# Patient Record
Sex: FEMALE | Race: WHITE | NOT HISPANIC OR LATINO | URBAN - METROPOLITAN AREA
[De-identification: names, ages, dates, MRNs, and addresses within clinical notes are randomized per-mention and may not be internally consistent; named-entity substitution may affect disease eponyms.]

---
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
## 2023-03-15 NOTE — BRIEF OPERATIVE NOTE - NSICDXBRIEFPREOP_GEN_ALL_CORE_FT
PRE-OP DIAGNOSIS:  Scar contracture 13-May-2022 15:44:57  Asiya Fitzgerald  Macromastia 13-May-2022 15:44:39  Asiya Fitzgerald  
Mother

## 2023-08-17 NOTE — ED PEDIATRIC NURSE NOTE - CAS TRG GEN SKIN CONDITION
Warm
VITALS:  Vital Signs Last 24 Hrs  T(C): 37.1 (17 Aug 2023 00:23), Max: 37.5 (16 Aug 2023 21:26)  T(F): 98.7 (17 Aug 2023 00:23), Max: 99.5 (16 Aug 2023 21:26)  HR: 71 (17 Aug 2023 00:23) (39 - 71)  BP: 138/76 (17 Aug 2023 00:23) (132/74 - 156/72)  BP(mean): --  RR: 16 (17 Aug 2023 00:23) (16 - 18)  SpO2: 97% (17 Aug 2023 00:23) (96% - 100%)    Parameters below as of 17 Aug 2023 00:23  Patient On (Oxygen Delivery Method): room air        PHYSICAL EXAM:  Gen: No acute distress.  Abd: Soft, moderate epigastric and RUQ tenderness, negative Carter's, nondistended, no rebound, no guarding.  Ext: Warm and well-perfused